# Patient Record
Sex: MALE | Race: WHITE | Employment: FULL TIME | URBAN - METROPOLITAN AREA
[De-identification: names, ages, dates, MRNs, and addresses within clinical notes are randomized per-mention and may not be internally consistent; named-entity substitution may affect disease eponyms.]

---

## 2020-01-23 ENCOUNTER — OFFICE VISIT (OUTPATIENT)
Dept: URGENT CARE | Facility: CLINIC | Age: 20
End: 2020-01-23
Payer: COMMERCIAL

## 2020-01-23 VITALS
RESPIRATION RATE: 18 BRPM | HEART RATE: 76 BPM | HEIGHT: 66 IN | OXYGEN SATURATION: 96 % | WEIGHT: 171.06 LBS | TEMPERATURE: 98 F | DIASTOLIC BLOOD PRESSURE: 58 MMHG | BODY MASS INDEX: 27.49 KG/M2 | SYSTOLIC BLOOD PRESSURE: 129 MMHG

## 2020-01-23 DIAGNOSIS — R68.89 FLU-LIKE SYMPTOMS: ICD-10-CM

## 2020-01-23 DIAGNOSIS — R06.02 SHORTNESS OF BREATH: ICD-10-CM

## 2020-01-23 DIAGNOSIS — J06.9 URI WITH COUGH AND CONGESTION: Primary | ICD-10-CM

## 2020-01-23 LAB
CTP QC/QA: YES
FLUAV AG NPH QL: NEGATIVE
FLUBV AG NPH QL: NEGATIVE

## 2020-01-23 PROCEDURE — 99204 OFFICE O/P NEW MOD 45 MIN: CPT | Mod: 25,S$GLB,, | Performed by: NURSE PRACTITIONER

## 2020-01-23 PROCEDURE — 99204 PR OFFICE/OUTPT VISIT, NEW, LEVL IV, 45-59 MIN: ICD-10-PCS | Mod: 25,S$GLB,, | Performed by: NURSE PRACTITIONER

## 2020-01-23 PROCEDURE — 87804 INFLUENZA ASSAY W/OPTIC: CPT | Mod: QW,S$GLB,, | Performed by: NURSE PRACTITIONER

## 2020-01-23 PROCEDURE — 87804 POCT INFLUENZA A/B: ICD-10-PCS | Mod: 59,QW,S$GLB, | Performed by: NURSE PRACTITIONER

## 2020-01-23 RX ORDER — PREDNISONE 20 MG/1
20 TABLET ORAL DAILY
Qty: 5 TABLET | Refills: 0 | Status: SHIPPED | OUTPATIENT
Start: 2020-01-23 | End: 2020-01-28

## 2020-01-23 RX ORDER — BROMPHENIRAMINE MALEATE, PSEUDOEPHEDRINE HYDROCHLORIDE, AND DEXTROMETHORPHAN HYDROBROMIDE 2; 30; 10 MG/5ML; MG/5ML; MG/5ML
10 SYRUP ORAL EVERY 6 HOURS PRN
Qty: 118 ML | Refills: 0 | Status: SHIPPED | OUTPATIENT
Start: 2020-01-23 | End: 2020-02-02

## 2020-01-23 NOTE — PROGRESS NOTES
"Subjective:       Patient ID: Isrrael Andrade is a 19 y.o. male.    Vitals:  height is 5' 6.06" (1.678 m) and weight is 77.6 kg (171 lb 1.2 oz). His temperature is 97.9 °F (36.6 °C). His blood pressure is 129/58 (abnormal) and his pulse is 76. His respiration is 18 and oxygen saturation is 96%.     Chief Complaint: URI    URI    This is a new problem. The current episode started in the past 7 days (3 days ago). The problem has been gradually improving. Maximum temperature: Low grade in 90's. Associated symptoms include congestion, coughing, headaches, nausea, a plugged ear sensation, sinus pain, sneezing, a sore throat and wheezing. Pertinent negatives include no abdominal pain, chest pain, diarrhea, dysuria, ear pain, joint pain, joint swelling, neck pain, rash, rhinorrhea, swollen glands or vomiting. He has tried acetaminophen (OTC Cold & Flu) for the symptoms. The treatment provided mild relief.       Constitution: Positive for chills, sweating, fatigue and fever.   HENT: Positive for congestion, sinus pain, sinus pressure, sore throat and voice change. Negative for ear pain.    Neck: Negative for neck pain and painful lymph nodes.   Cardiovascular: Negative for chest pain.   Eyes: Negative for eye redness.   Respiratory: Positive for chest tightness, cough, sputum production and wheezing. Negative for bloody sputum, COPD, shortness of breath, stridor and asthma.    Gastrointestinal: Positive for nausea. Negative for abdominal pain, vomiting and diarrhea.   Genitourinary: Negative for dysuria.   Musculoskeletal: Negative for muscle ache.        Body aches   Skin: Negative for rash.   Allergic/Immunologic: Positive for sneezing. Negative for seasonal allergies and asthma.   Neurological: Positive for headaches.   Hematologic/Lymphatic: Negative for swollen lymph nodes.       Objective:      Physical Exam   Constitutional: He is oriented to person, place, and time. He appears well-developed and well-nourished. He is " cooperative.  Non-toxic appearance. He does not have a sickly appearance. He does not appear ill. No distress.   HENT:   Head: Normocephalic and atraumatic.   Right Ear: Hearing, tympanic membrane, external ear and ear canal normal. Tympanic membrane is not erythematous. No middle ear effusion.   Left Ear: Hearing, external ear and ear canal normal. Tympanic membrane is not erythematous. A middle ear effusion is present.   Nose: Rhinorrhea present. No mucosal edema or nasal deformity. No epistaxis. Right sinus exhibits no maxillary sinus tenderness and no frontal sinus tenderness. Left sinus exhibits no maxillary sinus tenderness and no frontal sinus tenderness.   Mouth/Throat: Uvula is midline, oropharynx is clear and moist and mucous membranes are normal. No trismus in the jaw. Normal dentition. No uvula swelling. Cobblestoning present. No oropharyngeal exudate, posterior oropharyngeal edema or posterior oropharyngeal erythema.   Nasal congestion noted   Eyes: Conjunctivae and lids are normal. No scleral icterus.   Neck: Trachea normal, full passive range of motion without pain and phonation normal. Neck supple. No neck rigidity. No edema and no erythema present.   Cardiovascular: Normal rate, regular rhythm, normal heart sounds, intact distal pulses and normal pulses.   Pulmonary/Chest: Effort normal. No respiratory distress. He has no decreased breath sounds. He has rhonchi (cleared with cough) in the right upper field and the left upper field.   Abdominal: Normal appearance.   Musculoskeletal: Normal range of motion. He exhibits no edema or deformity.   Neurological: He is alert and oriented to person, place, and time. He exhibits normal muscle tone. Coordination normal.   Skin: Skin is warm, dry, intact, not diaphoretic and not pale.   Psychiatric: He has a normal mood and affect. His speech is normal and behavior is normal. Judgment and thought content normal. Cognition and memory are normal.   Nursing note  and vitals reviewed.        Assessment:       1. URI with cough and congestion    2. Shortness of breath    3. Flu-like symptoms        Plan:         URI with cough and congestion  -     brompheniramine-pseudoeph-DM (BROMFED DM) 2-30-10 mg/5 mL Syrp; Take 10 mLs by mouth every 6 (six) hours as needed (cough and congestion).  Dispense: 118 mL; Refill: 0    Shortness of breath  -     predniSONE (DELTASONE) 20 MG tablet; Take 1 tablet (20 mg total) by mouth once daily. for 5 days  Dispense: 5 tablet; Refill: 0    Flu-like symptoms  -     POCT Influenza A/B         Results for orders placed or performed in visit on 01/23/20   POCT Influenza A/B   Result Value Ref Range    Rapid Influenza A Ag Negative Negative    Rapid Influenza B Ag Negative Negative     Acceptable Yes      Lab results reviewed and discussed with patient.    · Your symptoms are likely due to a viral infection. These infections can last up to 14 days, but you should notice some improvement of your symptoms within the first 7-10 days. Viral infections will not improve with antibiotics. If your symptoms persist >10 days without improvement or if you have any new or worsening symptoms, this is an indication that you may have developed a bacterial infection and should return to your primary care provider or to Urgent Care.   · Getting plenty of rest is very important to fighting infections.  · Increase fluids.   · May apply warm compresses as needed for facial pain and congestion.   · Humidifier or steamy shower for congestion.  · Flonase or Nasacort to reduce inflammation in the sinus cavities.  · You may take an over the counter antihistamine for allergy symptoms such as sneezing, itchy/watery eyes, scratchy throat, or congestion.  · Take Tylenol or Ibuprofen as needed for sore throat, body aches, or fever.  · Don't drive, drink alcohol, or take any other medication or substance that causes sedation while taking cough syrup.   · Follow up  with your primary care provider if symptoms persist >10 days or sooner for any new or worsening symptoms.   · Go to the ER for any fever that does not improve with Tylenol/Ibuprofen, neck stiffness, rash, severe headache, vision changes, shortness of breath, chest pain, facial swelling, severe facial pain, or any other new and concerning symptoms.

## 2020-01-23 NOTE — LETTER
January 23, 2020      [facility]  [address], [address]  [place] [address]  Phone: [phone]       Patient: Isrrael Andrade   YOB: 2000  Date of Visit: 01/23/2020    To Whom It May Concern:    PERNELL Andrade  was at [facility] on 01/23/2020. He may return to work/school on 1/24/2020 with no restrictions. If you have any questions or concerns, or if I can be of further assistance, please do not hesitate to contact me.    Sincerely,      Weston Araiza, NP

## 2020-01-23 NOTE — PATIENT INSTRUCTIONS
· Your symptoms are likely due to a viral infection. These infections can last up to 14 days, but you should notice some improvement of your symptoms within the first 7-10 days. Viral infections will not improve with antibiotics. If your symptoms persist >10 days without improvement or if you have any new or worsening symptoms, this is an indication that you may have developed a bacterial infection and should return to your primary care provider or to Urgent Care.   · Getting plenty of rest is very important to fighting infections.  · Increase fluids.   · May apply warm compresses as needed for facial pain and congestion.   · Humidifier or steamy shower for congestion.  · Flonase or Nasacort to reduce inflammation in the sinus cavities.  · You may take an over the counter antihistamine for allergy symptoms such as sneezing, itchy/watery eyes, scratchy throat, or congestion.  · Take Tylenol or Ibuprofen as needed for sore throat, body aches, or fever.  · Don't drive, drink alcohol, or take any other medication or substance that causes sedation while taking cough syrup.   · Follow up with your primary care provider if symptoms persist >10 days or sooner for any new or worsening symptoms.   · Go to the ER for any fever that does not improve with Tylenol/Ibuprofen, neck stiffness, rash, severe headache, vision changes, shortness of breath, chest pain, facial swelling, severe facial pain, or any other new and concerning symptoms.

## 2020-01-26 ENCOUNTER — TELEPHONE (OUTPATIENT)
Dept: URGENT CARE | Facility: CLINIC | Age: 20
End: 2020-01-26